# Patient Record
Sex: MALE | URBAN - METROPOLITAN AREA
[De-identification: names, ages, dates, MRNs, and addresses within clinical notes are randomized per-mention and may not be internally consistent; named-entity substitution may affect disease eponyms.]

---

## 2024-03-26 ENCOUNTER — LAB REQUISITION (OUTPATIENT)
Dept: LAB | Facility: CLINIC | Age: 29
End: 2024-03-26

## 2024-03-26 DIAGNOSIS — Z11.3 ENCOUNTER FOR SCREENING FOR INFECTIONS WITH A PREDOMINANTLY SEXUAL MODE OF TRANSMISSION: ICD-10-CM

## 2024-03-26 DIAGNOSIS — Z11.1 ENCOUNTER FOR SCREENING FOR RESPIRATORY TUBERCULOSIS: ICD-10-CM

## 2024-03-26 LAB
HCV AB SERPL QL IA: NONREACTIVE
HIV 1+2 AB+HIV1 P24 AG SERPL QL IA: NONREACTIVE
T PALLIDUM AB SER QL: NONREACTIVE

## 2024-03-26 PROCEDURE — 86780 TREPONEMA PALLIDUM: CPT | Performed by: NURSE PRACTITIONER

## 2024-03-26 PROCEDURE — 87491 CHLMYD TRACH DNA AMP PROBE: CPT | Performed by: NURSE PRACTITIONER

## 2024-03-26 PROCEDURE — 87591 N.GONORRHOEAE DNA AMP PROB: CPT | Performed by: NURSE PRACTITIONER

## 2024-03-26 PROCEDURE — 86803 HEPATITIS C AB TEST: CPT | Performed by: NURSE PRACTITIONER

## 2024-03-26 PROCEDURE — 86481 TB AG RESPONSE T-CELL SUSP: CPT | Performed by: NURSE PRACTITIONER

## 2024-03-26 PROCEDURE — 87389 HIV-1 AG W/HIV-1&-2 AB AG IA: CPT | Performed by: NURSE PRACTITIONER

## 2024-03-27 LAB
C TRACH DNA SPEC QL NAA+PROBE: NEGATIVE
GAMMA INTERFERON BACKGROUND BLD IA-ACNC: 0.04 IU/ML
M TB IFN-G BLD-IMP: NEGATIVE
M TB IFN-G CD4+ BCKGRND COR BLD-ACNC: 9.96 IU/ML
MITOGEN IGNF BCKGRD COR BLD-ACNC: -0.03 IU/ML
MITOGEN IGNF BCKGRD COR BLD-ACNC: 0 IU/ML
N GONORRHOEA DNA SPEC QL NAA+PROBE: NEGATIVE
QUANTIFERON MITOGEN: 10 IU/ML
QUANTIFERON NIL TUBE: 0.04 IU/ML
QUANTIFERON TB1 TUBE: 0.04 IU/ML
QUANTIFERON TB2 TUBE: 0.01

## 2024-12-28 ENCOUNTER — APPOINTMENT (OUTPATIENT)
Dept: RADIOLOGY | Facility: HOSPITAL | Age: 29
End: 2024-12-28
Attending: EMERGENCY MEDICINE

## 2024-12-28 ENCOUNTER — HOSPITAL ENCOUNTER (EMERGENCY)
Facility: HOSPITAL | Age: 29
Discharge: HOME | End: 2024-12-28
Attending: EMERGENCY MEDICINE

## 2024-12-28 VITALS
OXYGEN SATURATION: 100 % | SYSTOLIC BLOOD PRESSURE: 123 MMHG | RESPIRATION RATE: 20 BRPM | DIASTOLIC BLOOD PRESSURE: 62 MMHG | WEIGHT: 295 LBS | TEMPERATURE: 98.3 F | HEART RATE: 87 BPM

## 2024-12-28 DIAGNOSIS — F45.8 HYPERVENTILATION SYNDROME: ICD-10-CM

## 2024-12-28 DIAGNOSIS — J06.9 UPPER RESPIRATORY TRACT INFECTION, UNSPECIFIED TYPE: ICD-10-CM

## 2024-12-28 LAB
FLUAV RNA SPEC QL NAA+PROBE: NEGATIVE
FLUBV RNA RESP QL NAA+PROBE: NEGATIVE
RSV RNA SPEC NAA+PROBE: NEGATIVE
SARS-COV-2 RNA RESP QL NAA+PROBE: NEGATIVE

## 2024-12-28 PROCEDURE — 99284 EMERGENCY DEPT VISIT MOD MDM: CPT | Mod: 25

## 2024-12-28 PROCEDURE — 87637 SARSCOV2&INF A&B&RSV AMP PRB: CPT | Performed by: EMERGENCY MEDICINE

## 2024-12-28 PROCEDURE — 71046 X-RAY EXAM CHEST 2 VIEWS: CPT

## 2024-12-28 ASSESSMENT — COLUMBIA-SUICIDE SEVERITY RATING SCALE - C-SSRS
6. HAVE YOU EVER DONE ANYTHING, STARTED TO DO ANYTHING, OR PREPARED TO DO ANYTHING TO END YOUR LIFE?: NO
2. HAVE YOU ACTUALLY HAD ANY THOUGHTS OF KILLING YOURSELF IN THE PAST MONTH?: NO
1. IN THE PAST MONTH, HAVE YOU WISHED YOU WERE DEAD OR WISHED YOU COULD GO TO SLEEP AND NOT WAKE UP?: NO

## 2024-12-28 ASSESSMENT — ACTIVITIES OF DAILY LIVING (ADL): ADLS_ACUITY_SCORE: 41

## 2024-12-28 NOTE — DISCHARGE INSTRUCTIONS
I do think that the numbness and tingling in the face, fingers, toes was related to hyperventilation.  I suspect that this shortness of breath and cough is related to a cold. Chest x-ray does not show any signs of pneumonia.  COVID/influenza/RSV swab negative.  You may use over-the-counter cough medications, Mucinex as needed for symptoms.   Abdomen soft, non-tender, no guarding. Rectal Exam: Clear fluid, no black or bloody stool.

## 2024-12-28 NOTE — ED PROVIDER NOTES
EMERGENCY DEPARTMENT ENCOUNTER      NAME: Adilene Musa  AGE: 29 year old male  YOB: 1995  MRN: [id number]  EVALUATION DATE & TIME: 12/28/2024  9:04 AM    PCP: No primary care provider on file.    ED PROVIDER: Lynn Gamble MD    Chief Complaint   Patient presents with    Shortness of Breath         FINAL IMPRESSION:  1. Hyperventilation syndrome    2. Upper respiratory tract infection, unspecified type          ED COURSE & MEDICAL DECISION MAKING:    Pertinent Labs & Imaging studies reviewed. (See chart for details)  29 year old male with history of otherwise healthy who presents to the Emergency Department for evaluation of shortness of breath and numbness in his face hands and feet.  He has had a mild cough and some shortness of breath over the course the last week that has actually been improving.  Strongly suspect that this is related to viral syndrome.  Worsening significant volumes of COVID, flu as well as mycoplasma pneumonia in our community at this time.  What really brought him in today is that with the shortness of breath he started having paresthesias around the face hands and fingertips.  He is anxious here and I strongly suspect that this is related to hyperventilation syndrome.  This was discussed with patient.  He is understanding.  COVID/influenza/RSV swab were obtained and negative.  Chest x-ray unremarkable.  Patient reassured counseled discharged home.      ED Course as of 12/28/24 1353   Sat Dec 28, 2024   0914 I met with the patient, obtained history, performed an initial exam, and discussed options and plan for diagnostics and treatment here in the ED.    0956 Chest XR,  PA & LAT  Chest x-ray independently interpreted by myself without cardiomegaly infiltrate or effusion   1015 I decided that the patient can be discharged.       Medical Decision Making  Obtained supplemental history:Supplemental history obtained?: No  Reviewed external records: External  "records reviewed?: No  Care impacted by chronic illness:Documented in Chart and Other: N/A  Did you consider but not order tests?: Work up considered but not performed and documented in chart, if applicable  Did you interpret images independently?: Independent interpretation of ECG and images noted in documentation, when applicable.  Consultation discussion with other provider:Did you involve another provider (consultant, , pharmacy, etc.)?: No  Discharge. No recommendations on prescription strength medication(s). See documentation for any additional details.    MIPS: Not Applicable      At the conclusion of the encounter I discussed the results of all of the tests and the disposition. The questions were answered. The patient or family acknowledged understanding and was agreeable with the care plan.      MEDICATIONS GIVEN IN THE EMERGENCY:  Medications - No data to display    NEW PRESCRIPTIONS STARTED AT TODAY'S ER VISIT  There are no discharge medications for this patient.         =================================================================    HPI    Patient information was obtained from: patient    Use of Intrepreter: Yes (Phone) - Language: Northern Irish       [facility] Bunny Musa is a 29 year old male with no pertinent medical history who presents with shortness of breath.     Per patient, a week ago he developed shortness of breath but it got better. This morning (12/28/2024) he woke up and felt fine but as the day went on his shortness of breath returned. He said his hands, feet, and face felt numb and he felt not dizzy/lightheaded but \"swollen\" in his head. Also, he has had a light cough with phlegm over the past few days, but it was gone today. Here in the emergency department the numbness has gone away.    He has had no numbness in his lips. Recently he has not traveled or been around anyone sick. He did not mention taking anything for his symptoms today. No daily medications were " mentioned.      PAST MEDICAL HISTORY:  No past medical history on file.    PAST SURGICAL HISTORY:  No past surgical history on file.    CURRENT MEDICATIONS:    None       ALLERGIES:  No Known Allergies    FAMILY HISTORY:  No family history on file.    SOCIAL HISTORY:        VITALS:  Patient Vitals for the past 24 hrs:   BP Temp Temp src Pulse Resp SpO2 Weight   12/28/24 1020 123/62 -- -- 87 -- 100 % --   12/28/24 0940 131/61 -- -- 82 -- 100 % --   12/28/24 0903 (!) 142/88 98.3  F (36.8  C) Temporal 77 20 99 % 133.8 kg (295 lb)       PHYSICAL EXAM    General Appearance: Well-appearing, well-nourished, no acute distress, obese   Cardio:  Regular rate and rhythm, no murmur/gallop/rub, 2+ pulses symmetric in all extremities  Pulm:  No respiratory distress, clear to auscultation bilaterally  Back:  No CVA tenderness, normal ROM  Abdomen:  Soft, non-tender  Extremities: Normal gait  Skin:  Skin warm, dry, no rashes  Neuro:  Alert and oriented ×3     RADIOLOGY/LABS:  Reviewed all pertinent imaging. Please see official radiology report. All pertinent labs reviewed and interpreted.    Results for orders placed or performed during the hospital encounter of 12/28/24   Chest XR,  PA & LAT    Impression    IMPRESSION: Negative chest.   Influenza A/B, RSV and SARS-CoV2 PCR (COVID-19) Nose    Specimen: Nose; Swab   Result Value Ref Range    Influenza A PCR Negative Negative    Influenza B PCR Negative Negative    RSV PCR Negative Negative    SARS CoV2 PCR Negative Negative       The creation of this record is based on the scribe s observations of the work being performed by Lynn Gamble MD and the provider s statements to them. It was created on her behalf by Santiago Cole, a trained medical scribe. This document has been checked and approved by the attending provider.    Lynn Gamble MD  Emergency Medicine  [facility] EMERGENCY DEPARTMENT  [address]  [address]  [phone]  Dept: [phone]      Lynn Gamble MD  12/28/24 9609

## 2025-01-02 ENCOUNTER — APPOINTMENT (OUTPATIENT)
Dept: RADIOLOGY | Facility: HOSPITAL | Age: 30
End: 2025-01-02

## 2025-01-02 ENCOUNTER — HOSPITAL ENCOUNTER (EMERGENCY)
Facility: HOSPITAL | Age: 30
Discharge: HOME | End: 2025-01-02
Attending: EMERGENCY MEDICINE

## 2025-01-02 VITALS
WEIGHT: 292 LBS | TEMPERATURE: 97.6 F | RESPIRATION RATE: 16 BRPM | HEART RATE: 65 BPM | OXYGEN SATURATION: 100 % | SYSTOLIC BLOOD PRESSURE: 123 MMHG | DIASTOLIC BLOOD PRESSURE: 71 MMHG

## 2025-01-02 DIAGNOSIS — R06.02 SHORTNESS OF BREATH: ICD-10-CM

## 2025-01-02 LAB
ALBUMIN SERPL BCG-MCNC: 4.6 G/DL (ref 3.5–5.2)
ALP SERPL-CCNC: 103 U/L (ref 40–150)
ALT SERPL W P-5'-P-CCNC: 34 U/L (ref 0–70)
ANION GAP SERPL CALCULATED.3IONS-SCNC: 15 MMOL/L (ref 7–15)
AST SERPL W P-5'-P-CCNC: 30 U/L (ref 0–45)
BILIRUB SERPL-MCNC: 0.7 MG/DL
BUN SERPL-MCNC: 10.6 MG/DL (ref 6–20)
CALCIUM SERPL-MCNC: 9.7 MG/DL (ref 8.8–10.4)
CHLORIDE SERPL-SCNC: 107 MMOL/L (ref 98–107)
CREAT SERPL-MCNC: 0.88 MG/DL (ref 0.67–1.17)
EGFRCR SERPLBLD CKD-EPI 2021: >90 ML/MIN/1.73M2
ERYTHROCYTE [DISTWIDTH] IN BLOOD BY AUTOMATED COUNT: 13.8 % (ref 10–15)
GLUCOSE SERPL-MCNC: 90 MG/DL (ref 70–99)
HCO3 SERPL-SCNC: 22 MMOL/L (ref 22–29)
HCT VFR BLD AUTO: 47.1 % (ref 40–53)
HGB BLD-MCNC: 15.2 G/DL (ref 13.3–17.7)
MCH RBC QN AUTO: 25.8 PG (ref 26.5–33)
MCHC RBC AUTO-ENTMCNC: 32.3 G/DL (ref 31.5–36.5)
MCV RBC AUTO: 80 FL (ref 78–100)
PLATELET # BLD AUTO: 258 10E3/UL (ref 150–450)
POTASSIUM SERPL-SCNC: 3.9 MMOL/L (ref 3.4–5.3)
PROT SERPL-MCNC: 8.1 G/DL (ref 6.4–8.3)
RBC # BLD AUTO: 5.89 10E6/UL (ref 4.4–5.9)
SODIUM SERPL-SCNC: 144 MMOL/L (ref 135–145)
TROPONIN T SERPL HS-MCNC: <6 NG/L
WBC # BLD AUTO: 8.3 10E3/UL (ref 4–11)

## 2025-01-02 PROCEDURE — 36415 COLL VENOUS BLD VENIPUNCTURE: CPT

## 2025-01-02 PROCEDURE — 84484 ASSAY OF TROPONIN QUANT: CPT

## 2025-01-02 PROCEDURE — 82247 BILIRUBIN TOTAL: CPT

## 2025-01-02 PROCEDURE — 71046 X-RAY EXAM CHEST 2 VIEWS: CPT

## 2025-01-02 PROCEDURE — 85027 COMPLETE CBC AUTOMATED: CPT

## 2025-01-02 PROCEDURE — 93005 ELECTROCARDIOGRAM TRACING: CPT

## 2025-01-02 RX ORDER — HYDROXYZINE HYDROCHLORIDE 25 MG/1
25 TABLET, FILM COATED ORAL ONCE
Status: COMPLETED | OUTPATIENT
Start: 2025-01-02 | End: 2025-01-02

## 2025-01-02 RX ORDER — HYDROXYZINE HYDROCHLORIDE 25 MG/1
25 TABLET, FILM COATED ORAL 3 TIMES DAILY PRN
Qty: 30 TABLET | Refills: 0 | Status: SHIPPED | OUTPATIENT
Start: 2025-01-02

## 2025-01-02 ASSESSMENT — COLUMBIA-SUICIDE SEVERITY RATING SCALE - C-SSRS
1. IN THE PAST MONTH, HAVE YOU WISHED YOU WERE DEAD OR WISHED YOU COULD GO TO SLEEP AND NOT WAKE UP?: NO
2. HAVE YOU ACTUALLY HAD ANY THOUGHTS OF KILLING YOURSELF IN THE PAST MONTH?: NO
6. HAVE YOU EVER DONE ANYTHING, STARTED TO DO ANYTHING, OR PREPARED TO DO ANYTHING TO END YOUR LIFE?: NO

## 2025-01-02 NOTE — ED PROVIDER NOTES
"Emergency Department Encounter   NAME: Adilene Musa  AGE: 29 year old male  YOB: 1995  MRN: [id number]    PCP: No Ref-Primary, Physician  ED PROVIDER: Karen Hoffman PA-C    Evaluation Date & Time:   No admission date for patient encounter.    CHIEF COMPLAINT:  Shortness of Breath      Impression and Plan   MDM: 29-year-old male with no pertinent history presents for evaluation of shortness of breath.  He describes intermittent feelings of shortness of breath happening at least once a day.  Usually at night after he gets home from work.  He states that he still begins to feel very anxious and breathes really quickly which makes his hands and feet feel tingly.  Reports that when he \"calms down\" his symptoms completely resolve.  Currently he is feeling a little short of breath.  He is most concerned that his shortness of breath is something with his heart and that he would like to feel better.  On arrival here patient is vitally stable.  Afebrile.  On my exam patient is in no acute distress.  Unremarkable cardiac and pulmonary exams.  Patient is PERC negative.  I have low suspicion for PE and do not think that any further workup for this is indicated at this time which patient is agreeable to.  No wheezing, tachycardia, hypoxia concerning for reactive airway.  Patient had negative viral swab 6 days ago and has no other URI/infectious symptoms.  I do not think it would be worth repeating this today.  We discussed it sounds a lot like he is having some underlying anxiety/panic attacks that would be driving this.  He has no associated palpitations and he has never had this happen before.  Though he does tell me that he does feel quite anxious before this happens and while these episodes are happening before he is able to calm himself down.  We discussed obtaining a workup to assess for anemia, electrolyte abnormality, ACS, pneumonia, pneumothorax which patient is agreeable to.  Will try " hydroxyzine for his symptoms at this time which patient is agreeable to.    No leukocytosis.  No anemia.  No concerning electrolyte abnormality or BRUCE.  Normal LFTs.  EKG without evidence of arrhythmia or ischemia.  Once under 6, ACS is unlikely.  Chest x-ray per my independent interpretation without any consolidation concerning for pneumonia, pneumothorax, pleural effusion.  Supported by radiology read.    I reassessed the patient.  He is feeling much better after hydroxyzine.  He is breathing comfortably.  Remains hemodynamically stable.  We discussed reassuring workup.  I referred him to primary care so that he can follow-up with them.  I did also prescribe him some hydroxyzine and we discussed using this as needed as soon as he feels his symptoms coming on which she is agreeable to.  We reviewed strict return precautions and patient was discharged home in stable condition.      I have staffed the patient with Dr. Doll, ED physician, who will evaluate the patient and agrees with all aspects of today's care.          Medical Decision Making  Obtained supplemental history:Supplemental history obtained?: No  Reviewed external records: External records reviewed?: Documented in chart and Outpatient Record: [facility] emergency department 12/28/2024: Mild cough and some shortness of breath that has been improving.  He started to have paresthesias around his face, hands, and fingertip.  Consistent with hyperventilation syndrome.  Negative viral swab.  Unremarkable chest x-ray.  Patient reassured and discharged home.  Care impacted by chronic illness:Documented in Chart  Did you consider but not order tests?: Work up considered but not performed and documented in chart, if applicable  Did you interpret images independently?: Independent interpretation of ECG and images noted in documentation, when applicable.  Consultation discussion with other provider:Did you involve another provider (consultant, , pharmacy,  "etc.)?: No  Discharge. I prescribed additional prescription strength medication(s) as charted. N/A.    MIPS: Not Applicable        FINAL IMPRESSION:    ICD-10-CM    1. Shortness of breath  R06.02 Primary Care Referral            MEDICATIONS GIVEN IN THE EMERGENCY DEPARTMENT:  Medications   hydrOXYzine HCl (ATARAX) tablet 25 mg (25 mg Oral Not Given 1/2/25 1314)         NEW PRESCRIPTIONS STARTED AT TODAY'S ED VISIT:  Discharge Medication List as of 1/2/2025 12:43 PM        START taking these medications    Details   hydrOXYzine HCl (ATARAX) 25 MG tablet Take 1 tablet (25 mg) by mouth 3 times daily as needed for anxiety., Disp-30 tablet, R-0, Local Print               HPI   Patient information was obtained from: patient   Use of Intrepreter: N/A    Adilene Bunny Musa is a 29 year old male with no pertinent history  who presents to the ED by car for evaluation of shortness of breath.  Patient was seen here 1 week ago for shortness of breath and had negative swab and chest x-ray.  He states that since then he has had daily episodes of feeling short of breath.  He states that he breathes really fast and then his hands and feet get tingly.  Once he is able to \"calm down\" his symptoms completely resolve.  He endorses intermittent chest pain with these episodes.  Currently he feels well.  Denies any chest pain currently.  Denies any cough, congestion, fever.  No history of asthma.  He does not smoke.  States that he is new to [place] and he came here by himself and he has felt quite anxious about that.  He also is not quite sure how the medical system here works which has caused some frustration.  He states that he would like to \"feel better\" when he gets home.  Feels like last time he came here he did not have any improvement in his symptoms.      REVIEW OF SYSTEMS:  Pertinent positive and negative symptoms per HPI.       Physical Exam     First Vitals:  Patient Vitals for the past 24 hrs:   BP Temp Temp src " Pulse Resp SpO2 Weight   01/02/25 1309 123/71 -- -- 65 16 100 % --   01/02/25 0946 136/75 97.6  F (36.4  C) Oral 80 18 99 % 132.5 kg (292 lb)       PHYSICAL EXAM:   General Appearance:  Alert, cooperative, no distress, appears stated age  HENT: Normocephalic without obvious deformity, atraumatic. Mucous membranes moist   Eyes: Conjunctiva clear, Lids normal. No discharge.   Respiratory: No distress. Lungs clear to ausculation bilaterally. No wheezes, rhonchi or stridor  Cardiovascular: Regular rate and rhythm, no murmur. Normal cap refill. No peripheral edema  Musculoskeletal: Moving all extremities. No gross deformities  Integument: Warm, dry, no rashes or lesions  Neurologic: Alert and orientated x3. GCS 15.  No focal deficit.  5/5 upper and lower extremity strength bilaterally.  Sensation intact to light touch.  Ambulating with a smooth gait.  Psych: Normal mood and affect      Results     LAB:  All pertinent labs reviewed and interpreted  Labs Ordered and Resulted from Time of ED Arrival to Time of ED Departure   CBC WITH PLATELETS - Abnormal       Result Value    WBC Count 8.3      RBC Count 5.89      Hemoglobin 15.2      Hematocrit 47.1      MCV 80      MCH 25.8 (*)     MCHC 32.3      RDW 13.8      Platelet Count 258     COMPREHENSIVE METABOLIC PANEL - Normal    Sodium 144      Potassium 3.9      Carbon Dioxide (CO2) 22      Anion Gap 15      Urea Nitrogen 10.6      Creatinine 0.88      GFR Estimate >90      Calcium 9.7      Chloride 107      Glucose 90      Alkaline Phosphatase 103      AST 30      ALT 34      Protein Total 8.1      Albumin 4.6      Bilirubin Total 0.7     TROPONIN T, HIGH SENSITIVITY - Normal    Troponin T, High Sensitivity <6         RADIOLOGY:  Chest XR,  PA & LAT   Final Result   IMPRESSION: Negative chest.          ECG:  Performed at: 1046    Impression: sinus rhythm    Rate: 60  Rhythm: sinus  WV Interval: 122  QRS Interval: 106  QTc Interval: 396  ST Changes: none  Comparison:  none    EKG results reviewed and interpreted by Dr. Doll, ED MD.         Karen Hoffman PA-C   Emergency Medicine   [facility] EMERGENCY DEPARTMENT       Karen Hoffman PA-C  01/02/25 2165

## 2025-01-02 NOTE — ED PROVIDER NOTES
Emergency Department Midlevel Supervisory Note    Date/Time:1/2/2025 10:33 AM    I personally saw the patient and performed a substantive portion of the visit including all aspects of the medical decision making. I am seeing this patient along with Karen Hoffman PA-C.  I, Austin Doll D.O., have reviewed the documentation, personally taken the patient's history, performed an exam and agree with the physical finds, diagnosis and management plan.    Prior records were reviewed. I personally performed history and physical.  I personally reviewed lab, EKG, and radiology results as indicated. Care was discussed with mid-level provider. Diagnosis and disposition were discussed. Final disposition will be per the [facility] depending diagnostic studies and patient's clinical trajectory.      ED Course:  10:33 AM Karen Hoffman PA-C, staffed patient with me. I agree with their assessment and plan of management, and I will see the patient.  10:38 AM I met with the patient to introduce myself, gather additional history, perform my initial exam, and discuss the plan.         DIAGNOSIS:  No diagnosis found.      Medications:  New Prescriptions    No medications on file         Labs and Imaging:  Results for orders placed or performed during the hospital encounter of 01/02/25   Chest XR,  PA & LAT    Impression    IMPRESSION: Negative chest.   CBC with platelets   Result Value Ref Range    WBC Count 8.3 4.0 - 11.0 10e3/uL    RBC Count 5.89 4.40 - 5.90 10e6/uL    Hemoglobin 15.2 13.3 - 17.7 g/dL    Hematocrit 47.1 40.0 - 53.0 %    MCV 80 78 - 100 fL    MCH 25.8 (L) 26.5 - 33.0 pg    MCHC 32.3 31.5 - 36.5 g/dL    RDW 13.8 10.0 - 15.0 %    Platelet Count 258 150 - 450 10e3/uL   Comprehensive metabolic panel   Result Value Ref Range    Sodium 144 135 - 145 mmol/L    Potassium 3.9 3.4 - 5.3 mmol/L    Carbon Dioxide (CO2) 22 22 - 29 mmol/L    Anion Gap 15 7 - 15 mmol/L    Urea Nitrogen 10.6 6.0 - 20.0 mg/dL    Creatinine 0.88 0.67 - 1.17 mg/dL     GFR Estimate >90 >60 mL/min/1.73m2    Calcium 9.7 8.8 - 10.4 mg/dL    Chloride 107 98 - 107 mmol/L    Glucose 90 70 - 99 mg/dL    Alkaline Phosphatase 103 40 - 150 U/L    AST 30 0 - 45 U/L    ALT 34 0 - 70 U/L    Protein Total 8.1 6.4 - 8.3 g/dL    Albumin 4.6 3.5 - 5.2 g/dL    Bilirubin Total 0.7 <=1.2 mg/dL   Result Value Ref Range    Troponin T, High Sensitivity <6 <=22 ng/L           Austin Doll D.O.  Emergency Medicine  [facility] EMERGENCY DEPARTMENT  [address] [address] [phone]  Dept: [phone]

## 2025-01-02 NOTE — ED TRIAGE NOTES
Patient arrives by private car for evaluation of intermittent shortness of breath.  Reports in the morning has been waking with abdominal pain and numbness in his hands. Reports pain and numbness goes away when he relaxes.    Patient states he was seen for this and tested for covid/flu and was negative.    Patient does not have PCP clinic.

## 2025-01-02 NOTE — DISCHARGE INSTRUCTIONS
You were seen in the emergency department for evaluation of shortness of breath.  Thankfully your oxygen levels are perfect.  There is no sign of infection in the lungs.  No sign of problems with your heart that would be causing your symptoms.  Like we discussed it seems like you are having some anxiety/panic attacks which are contributing to your symptoms.  It is reassuring that once you are able to calm down your breathing returns to normal.  I prescribed you a medication called hydroxyzine to use 3 times a day as needed when you start to notice the symptoms.    I referred you to primary care.  Please call them to schedule an emergency department follow-up to discuss your symptoms.    Return to the emergency department for fever, new/worsening chest pain, shortness of breath, palpitations, loss of consciousness, or any other concerning symptoms.

## 2025-01-06 LAB
ATRIAL RATE - MUSE: 60 BPM
DIASTOLIC BLOOD PRESSURE - MUSE: NORMAL MMHG
INTERPRETATION ECG - MUSE: NORMAL
P AXIS - MUSE: 57 DEGREES
PR INTERVAL - MUSE: 122 MS
QRS DURATION - MUSE: 106 MS
QT - MUSE: 396 MS
QTC - MUSE: 396 MS
R AXIS - MUSE: 40 DEGREES
SYSTOLIC BLOOD PRESSURE - MUSE: NORMAL MMHG
T AXIS - MUSE: 31 DEGREES
VENTRICULAR RATE- MUSE: 60 BPM